# Patient Record
Sex: MALE
[De-identification: names, ages, dates, MRNs, and addresses within clinical notes are randomized per-mention and may not be internally consistent; named-entity substitution may affect disease eponyms.]

---

## 2020-09-23 PROBLEM — Z00.00 ENCOUNTER FOR PREVENTIVE HEALTH EXAMINATION: Status: ACTIVE | Noted: 2020-09-23

## 2020-10-09 ENCOUNTER — TRANSCRIPTION ENCOUNTER (OUTPATIENT)
Age: 35
End: 2020-10-09

## 2020-10-09 ENCOUNTER — APPOINTMENT (OUTPATIENT)
Dept: UROLOGY | Facility: CLINIC | Age: 35
End: 2020-10-09
Payer: COMMERCIAL

## 2020-10-09 VITALS
WEIGHT: 150 LBS | HEART RATE: 87 BPM | BODY MASS INDEX: 24.11 KG/M2 | OXYGEN SATURATION: 98 % | HEIGHT: 66 IN | TEMPERATURE: 97.7 F

## 2020-10-09 DIAGNOSIS — Z92.3 PERSONAL HISTORY OF IRRADIATION: ICD-10-CM

## 2020-10-09 DIAGNOSIS — Z80.8 FAMILY HISTORY OF MALIGNANT NEOPLASM OF OTHER ORGANS OR SYSTEMS: ICD-10-CM

## 2020-10-09 PROCEDURE — 99204 OFFICE O/P NEW MOD 45 MIN: CPT

## 2020-10-09 RX ORDER — LEVOTHYROXINE SODIUM 0.1 MG/1
100 TABLET ORAL
Refills: 0 | Status: ACTIVE | COMMUNITY
Start: 2020-10-09

## 2020-10-10 NOTE — HISTORY OF PRESENT ILLNESS
[None] : no symptoms [FreeTextEntry1] : Patient Adalgisa Buitrago\par Job: dental technician\par Partner Name: Celeste Carrasco\par Partner Age: 33\par Prior marriage: patient  x 4 year ; attempted pregnancy; diagnosed with prostatitis\par ? sperm issues at that time (2015 in [facility])\par Prior Pregnancy ETOP 2018\par Duration of Relationship: 10 months (together x 2 years)  \par Years unprotected sexual intercourse: 10 months (irregular menses) only 2 months have them been lving together (she is in [facility])\par Time [place]:\par Sexual dysfunction: no sexual dysfunction\par Artificial lubricants: none\par Exposure history:\par Thyroidectomy and I131 for thryroid cancer 2018

## 2020-10-10 NOTE — PHYSICAL EXAM
[General Appearance - Well Developed] : well developed [General Appearance - Well Nourished] : well nourished [Normal Appearance] : normal appearance [Well Groomed] : well groomed [General Appearance - In No Acute Distress] : no acute distress [Edema] : no peripheral edema [] : no respiratory distress [Respiration, Rhythm And Depth] : normal respiratory rhythm and effort [Exaggerated Use Of Accessory Muscles For Inspiration] : no accessory muscle use [Abdomen Soft] : soft [Abdomen Tenderness] : non-tender [Costovertebral Angle Tenderness] : no ~M costovertebral angle tenderness [Urethral Meatus] : meatus normal [Penis Abnormality] : normal uncircumcised penis [Epididymis] : the epididymides were normal [Testes Tenderness] : no tenderness of the testes [Testes Mass (___cm)] : there were no testicular masses [Normal Station and Gait] : the gait and station were normal for the patient's age [Skin Color & Pigmentation] : normal skin color and pigmentation [No Focal Deficits] : no focal deficits [Oriented To Time, Place, And Person] : oriented to person, place, and time [Inguinal Lymph Nodes Enlarged Bilaterally] : inguinal [FreeTextEntry1] : 4 x 2.5; 4 x 2.75; vas x3 ; left varicocele; doppler reflux

## 2020-10-10 NOTE — ASSESSMENT
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  She had previously had an ETOP.\par He has had 3 semen analyses\par \par semen analysis  (2/5/2020)\par 4.5\par [38.9]\par 9% motility\par 5 % morphology\par \par semen analysis  (6/5/2020)\par 3.6 cc\par [16.8]\par 22% motility\par 10 morphology\par \par The most recent semen analysis (10.2.2020)\par [137]\par tc 567.9\par % 44\par \par Was markedly different from prior without intervention. Endocrine studies were reported as being normal.  His PMHx of note is I 131 treatment for thyroid cancer is 2018.    He has been seen by Dr Alvaro Moe and Dr in [facility].   He was told of a left varicocele in [facility] and DNA fragmentation studies were obtained which appear to be normal.\par \par In light of disparate results will proceed with:\par 1. repeat semen analysis\par 2. scrotal ultrasound\par \par We discussed lack of data demonstrating effect of I 131 on male fertility.  We discussed potential impact of hypothyroidism and hyperthyroidism on fertility as he had been hypothyroid and has recently undergone increased replacement therapy\par The findings of a varicocele were discussed with the patient.\par  Discussed indications for varicocelectomy:\par 1, fertility,\par 2. ? development of hypogonadism\par 3. Cosmesis\par 4. Pain\par \par The findings of a varicocele were discussed with  DOMINICK NEGRON.\par The pathophysiology was discussed.\par The surgical procedure of microsurgical varicocelectomy was fully discussed. Risks and complications were reviewed including but not limited to: 1. recurrence, 2. chronic testicular pain/discomfort, 3. hydrocele (temporary or permanent), 4. testicular injury or atrophy, 5 vas or nerve injury, 6. infection, 7. bleeding ,  8. general surgical and anesthetic risks etc.\par We discussed response rates with regards to semen parameters and pregnancy rates. We discussed alternatives including assisted reproduction including IUI and IVF.\par \par They will be seen in followup after:\par 1. repeat semen analysis \par 2. scrotal ultrasound\par

## 2020-10-21 ENCOUNTER — RESULT REVIEW (OUTPATIENT)
Age: 35
End: 2020-10-21

## 2020-10-21 ENCOUNTER — OUTPATIENT (OUTPATIENT)
Dept: OUTPATIENT SERVICES | Facility: HOSPITAL | Age: 35
LOS: 1 days | End: 2020-10-21

## 2020-10-21 ENCOUNTER — APPOINTMENT (OUTPATIENT)
Dept: ULTRASOUND IMAGING | Facility: CLINIC | Age: 35
End: 2020-10-21
Payer: COMMERCIAL

## 2020-10-21 PROCEDURE — 76870 US EXAM SCROTUM: CPT | Mod: 26

## 2020-10-21 PROCEDURE — 93975 VASCULAR STUDY: CPT | Mod: 26

## 2020-10-23 ENCOUNTER — NON-APPOINTMENT (OUTPATIENT)
Age: 35
End: 2020-10-23

## 2020-10-30 ENCOUNTER — APPOINTMENT (OUTPATIENT)
Dept: UROLOGY | Facility: CLINIC | Age: 35
End: 2020-10-30
Payer: COMMERCIAL

## 2020-10-30 DIAGNOSIS — I86.1 SCROTAL VARICES: ICD-10-CM

## 2020-10-30 PROCEDURE — 99072 ADDL SUPL MATRL&STAF TM PHE: CPT

## 2020-10-30 PROCEDURE — 99213 OFFICE O/P EST LOW 20 MIN: CPT

## 2020-10-31 NOTE — HISTORY OF PRESENT ILLNESS
[FreeTextEntry1] : Patient Adalgisa Buitrago\par Job: dental technician\par Partner Name: Celeste Carrasco\par Partner Age: 33\par Prior marriage: patient  x 4 year ; attempted pregnancy; diagnosed with prostatitis\par ? sperm issues at that time (2015 in [facility])\par Prior Pregnancy ETOP 2018\par Duration of Relationship: 10 months (together x 2 years)  \par Years unprotected sexual intercourse: 10 months (irregular menses) only 2 months have them been lving together (she is in [facility])\par Time [place]:\par Sexual dysfunction: no sexual dysfunction\par Artificial lubricants: none\par Exposure history:\par Thyroidectomy and I131 for thryroid cancer 2018\par \par 10.30.2020 returns re infertility

## 2020-10-31 NOTE — ASSESSMENT
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  She had previously had an ETOP.\par He has had 3 semen analyses\par \par semen analysis  (2/5/2020)\par 4.5\par [38.9]\par 9% motility\par 5 % morphology\par \par semen analysis  (6/5/2020)\par 3.6 cc\par [16.8]\par 22% motility\par 10 morphology\par \par The most recent semen analysis (10.2.2020)\par [137]\par tc 567.9\par % 44\par \par Was markedly different from prior without intervention. Endocrine studies were reported as being normal.  His PMHx of note is I 131 treatment for thyroid cancer is 2018.    He has been seen by Dr Alvaro Moe and Dr in [facility].   He was told of a left varicocele in [facility] and DNA fragmentation studies were obtained which appear to be normal.\par \par In light of disparate results will proceed with:\par 1. repeat semen analysis\par 2. scrotal ultrasound\par \par We discussed lack of data demonstrating effect of I 131 on male fertility.  We discussed potential impact of hypothyroidism and hyperthyroidism on fertility as he had been hypothyroid and has recently undergone increased replacement therapy\par The findings of a varicocele were discussed with the patient.\par  Discussed indications for varicocelectomy:\par 1, fertility,\par 2. ? development of hypogonadism\par 3. Cosmesis\par 4. Pain\par \par The findings of a varicocele were discussed with  DOMINICK NEGRON.\par The pathophysiology was discussed.\par The surgical procedure of microsurgical varicocelectomy was fully discussed. Risks and complications were reviewed including but not limited to: 1. recurrence, 2. chronic testicular pain/discomfort, 3. hydrocele (temporary or permanent), 4. testicular injury or atrophy, 5 vas or nerve injury, 6. infection, 7. bleeding ,  8. general surgical and anesthetic risks etc.\par We discussed response rates with regards to semen parameters and pregnancy rates. We discussed alternatives including assisted reproduction including IUI and IVF.\par \par They will be seen in followup after:\par 1. repeat semen analysis \par 2. scrotal ultrasound\par \par 10.30.2020\par reviewed prior notes\par repeat semen analysis of 10/28/2020\par volume 3.5 ccc\par [62]\par motility 66% normal\par 6% normal morphology\par usg bilateral varicocele left 3.4 mm; right 3.8 mm\par \par discussed :\par 1 limited opportunity to conceive (only  2 months of attempted conception\par 2. would not recommend surgery based upon  limited opportunity to conceive and recent semen analysis\par 3. return semen analysis 6 months\par 4. [place] return to [place] in one month permanently\par